# Patient Record
Sex: MALE | Race: OTHER | NOT HISPANIC OR LATINO
[De-identification: names, ages, dates, MRNs, and addresses within clinical notes are randomized per-mention and may not be internally consistent; named-entity substitution may affect disease eponyms.]

---

## 2019-12-05 ENCOUNTER — APPOINTMENT (OUTPATIENT)
Dept: DERMATOLOGY | Facility: CLINIC | Age: 24
End: 2019-12-05
Payer: MEDICAID

## 2019-12-05 VITALS — BODY MASS INDEX: 27.98 KG/M2 | HEIGHT: 75 IN | WEIGHT: 225 LBS

## 2019-12-05 DIAGNOSIS — Z77.22 CONTACT WITH AND (SUSPECTED) EXPOSURE TO ENVIRONMENTAL TOBACCO SMOKE (ACUTE) (CHRONIC): ICD-10-CM

## 2019-12-05 PROBLEM — Z00.00 ENCOUNTER FOR PREVENTIVE HEALTH EXAMINATION: Status: ACTIVE | Noted: 2019-12-05

## 2019-12-05 PROCEDURE — 17111 DESTRUCTION B9 LESIONS 15/>: CPT

## 2019-12-05 RX ORDER — BUPRENORPHINE HCL/NALOXONE HCL 8 MG-2 MG
TABLET, SUBLINGUAL SUBLINGUAL
Refills: 0 | Status: ACTIVE | COMMUNITY

## 2019-12-05 NOTE — HISTORY OF PRESENT ILLNESS
[FreeTextEntry1] : Warts [de-identified] : First visit for 24-year-old white male with a two-year history of multiple warts on the hands. Treated with cryosurgery in the past-last time 2 months ago.\par States that warts have recently increased markedly.

## 2019-12-05 NOTE — CONSULT LETTER
[Consult Closing:] : Thank you very much for allowing me to participate in the care of this patient.  If you have any questions, please do not hesitate to contact me. [Dear  ___] : Dear  [unfilled], [Consult Letter:] : I had the pleasure of evaluating your patient, [unfilled]. [Sincerely,] : Sincerely, [FreeTextEntry2] : Almita Gray, DO [FreeTextEntry1] : He has multiple warts on the fingers and wrists which were treated with cryosurgery.\par \par Given the large number of warts that he has, it might be reasonable to order an HIV test on him and to vaccinate him against venereal warts.\par \par Please see attached chart note for further details. [FreeTextEntry3] : Camron Clark MD\par [address], [address]\par [place] [address]\par Tel ([phone])\par Fax ([phone])\par Private line ([phone])\par

## 2019-12-05 NOTE — PHYSICAL EXAM
[Eyelids] : Eyelids [Face] : Face [Nose] : Nose [Ears] : Ears [Lips] : Lips [FreeTextEntry3] : \par \par Multiple 2-5 mm pink verrucous papules present on the right hand, especially the third and fourth fingers\par One on the dorsum of the left fourth finger\par A few 1 mm warts present on the left volar wrist

## 2020-01-16 ENCOUNTER — APPOINTMENT (OUTPATIENT)
Dept: DERMATOLOGY | Facility: CLINIC | Age: 25
End: 2020-01-16
Payer: MEDICAID

## 2020-01-16 PROCEDURE — 17111 DESTRUCTION B9 LESIONS 15/>: CPT

## 2020-01-16 NOTE — PHYSICAL EXAM
[Alert] : alert [Oriented x 3] : ~L oriented x 3 [Well Nourished] : well nourished [FreeTextEntry3] : Group of small and large brown verrucous papules present on the right third finger with a few lesions on the right fourth finger left index finger left palm and right third subungual area

## 2020-01-16 NOTE — HISTORY OF PRESENT ILLNESS
[FreeTextEntry1] : Warts [de-identified] : Followup visit for 24-year-old white male first seen by me on December 5, 2019, with multiple warts on hands and fingers. 25+ lesions treated  with cryosurgery.  Warts have improved.

## 2020-01-21 ENCOUNTER — APPOINTMENT (OUTPATIENT)
Dept: PULMONOLOGY | Facility: CLINIC | Age: 25
End: 2020-01-21
Payer: MEDICAID

## 2020-01-21 VITALS
DIASTOLIC BLOOD PRESSURE: 70 MMHG | SYSTOLIC BLOOD PRESSURE: 120 MMHG | HEIGHT: 74 IN | OXYGEN SATURATION: 97 % | HEART RATE: 102 BPM | WEIGHT: 232 LBS | BODY MASS INDEX: 29.77 KG/M2

## 2020-01-21 DIAGNOSIS — Z86.39 PERSONAL HISTORY OF OTHER ENDOCRINE, NUTRITIONAL AND METABOLIC DISEASE: ICD-10-CM

## 2020-01-21 DIAGNOSIS — L30.9 DERMATITIS, UNSPECIFIED: ICD-10-CM

## 2020-01-21 DIAGNOSIS — F19.11 OTHER PSYCHOACTIVE SUBSTANCE ABUSE, IN REMISSION: ICD-10-CM

## 2020-01-21 DIAGNOSIS — Z80.8 FAMILY HISTORY OF MALIGNANT NEOPLASM OF OTHER ORGANS OR SYSTEMS: ICD-10-CM

## 2020-01-21 DIAGNOSIS — Z82.69 FAMILY HISTORY OF OTHER DISEASES OF THE MUSCULOSKELETAL SYSTEM AND CONNECTIVE TISSUE: ICD-10-CM

## 2020-01-21 PROCEDURE — 99204 OFFICE O/P NEW MOD 45 MIN: CPT

## 2020-02-13 ENCOUNTER — APPOINTMENT (OUTPATIENT)
Dept: DERMATOLOGY | Facility: CLINIC | Age: 25
End: 2020-02-13

## 2020-03-12 ENCOUNTER — APPOINTMENT (OUTPATIENT)
Dept: DERMATOLOGY | Facility: CLINIC | Age: 25
End: 2020-03-12
Payer: COMMERCIAL

## 2020-03-12 DIAGNOSIS — B07.8 OTHER VIRAL WARTS: ICD-10-CM

## 2020-03-12 DIAGNOSIS — R20.8 OTHER DISTURBANCES OF SKIN SENSATION: ICD-10-CM

## 2020-03-12 DIAGNOSIS — L24.9 IRRITANT CONTACT DERMATITIS, UNSPECIFIED CAUSE: ICD-10-CM

## 2020-03-12 PROCEDURE — 99213 OFFICE O/P EST LOW 20 MIN: CPT | Mod: 25

## 2020-03-12 PROCEDURE — 17110 DESTRUCTION B9 LES UP TO 14: CPT

## 2020-03-12 NOTE — PHYSICAL EXAM
[Alert] : alert [Oriented x 3] : ~L oriented x 3 [Well Nourished] : well nourished [FreeTextEntry3] : Hands:  Focal pink scaly and vesicular patches present in the right 3/4 finger web area and left lateral index finger area\par \par A few 2-3 mm tan papule was present on the right fourth finger \par One similar lesion in the right third finger\par 1 mm papules present on the left fifth finger and left volar wrist (2)\par Larger wart present in the right third subungual area

## 2020-03-12 NOTE — HISTORY OF PRESENT ILLNESS
[FreeTextEntry1] : Warts [de-identified] : Followup visit for 24-year-old white male last seen by me on January 16, 2020, with multiple warts on the fingers.  Has been treated with cryosurgery x2.\par Patient also complains of "eczema" from frequent hand washing.

## 2020-04-16 ENCOUNTER — APPOINTMENT (OUTPATIENT)
Dept: DERMATOLOGY | Facility: CLINIC | Age: 25
End: 2020-04-16

## 2020-07-07 ENCOUNTER — EMERGENCY (EMERGENCY)
Facility: HOSPITAL | Age: 25
LOS: 1 days | Discharge: HOME | End: 2020-07-07
Attending: STUDENT IN AN ORGANIZED HEALTH CARE EDUCATION/TRAINING PROGRAM
Payer: COMMERCIAL

## 2020-07-07 VITALS
TEMPERATURE: 98 F | HEIGHT: 75 IN | SYSTOLIC BLOOD PRESSURE: 112 MMHG | OXYGEN SATURATION: 98 % | DIASTOLIC BLOOD PRESSURE: 82 MMHG | RESPIRATION RATE: 16 BRPM | HEART RATE: 123 BPM | WEIGHT: 235.01 LBS

## 2020-07-07 PROCEDURE — 99283 EMERGENCY DEPT VISIT LOW MDM: CPT

## 2020-07-07 RX ORDER — SODIUM CHLORIDE 9 MG/ML
1000 INJECTION INTRAMUSCULAR; INTRAVENOUS; SUBCUTANEOUS ONCE
Refills: 0 | Status: COMPLETED | OUTPATIENT
Start: 2020-07-07 | End: 2020-07-07

## 2020-07-07 NOTE — ED PROVIDER NOTE - PROGRESS NOTE DETAILS
Results of work up discussed with patient and copies of all reports given.  Patient was already told by therapist that he should see a psychiatrist for anxiety and possibly to start low-dose benzo.  Suspect IBS.  Patient states his mom has both IBS and anxiety.  Will give GI referral.  Patient looks very comfortable. Results of work up discussed with patient and copies of all reports given.  Patient was already told by therapist that he should see a psychiatrist for anxiety and possibly to start low-dose benzo.  Suspect IBS.  Patient states his mom has both IBS and anxiety.  Will give GI referral.  Patient looks very comfortable.  Offered CT abd/pelvis but patient declining.  States he will schedule as an outpatient and understands to return to the ED for any concerns.

## 2020-07-07 NOTE — ED PROVIDER NOTE - CARE PROVIDER_API CALL
Robin Munguia  GASTROENTEROLOGY  [address] [place] [address]  Phone: [phone]  Fax: [phone]  Follow Up Time:

## 2020-07-07 NOTE — ED PROVIDER NOTE - NSFOLLOWUPINSTRUCTIONS_ED_ALL_ED_FT
Please follow up with your primary care doctor and GI.  Return to the ER for persistent abdominal pain, fever, vomiting, diarrhea, anxiety, depression, suicidal thoughts, or any other concerns.

## 2020-07-07 NOTE — ED PROVIDER NOTE - PATIENT PORTAL LINK FT
You can access the FollowMyHealth Patient Portal offered by [facility] by registering at the following website: [email or web address]. By joining [employer]’s FollowMyHealth portal, you will also be able to view your health information using other applications (apps) compatible with our system.

## 2020-07-07 NOTE — ED ADULT NURSE NOTE - OBJECTIVE STATEMENT
Pt presents to ED c/o nausea and anxiety x1 month. Pt states his gf gave him news that is taking a toll on him. He has a family hx of depression and anxiety. Pt states his cousin recently committed suicide. Pt feels depressed and stressed out. Pt woke up today diaphoretic and couldn't eat be cause he is nauseous. Pt denies nausea, chest pain, SOB at this time. Pt presents to ED c/o nausea and anxiety x1 month. Pt states his gf gave him news that is taking a toll on him. He has a family hx of depression and anxiety. Pt states his cousin recently committed suicide. Pt feels depressed and stressed out sees a therapist every Thursday. Pt woke up today diaphoretic vomited and had diarrhea and couldn't eat be cause he is nauseous but is tolerating PO fluids. Pt denies nausea, chest pain, SOB at this time.

## 2020-07-07 NOTE — ED PROVIDER NOTE - CHPI ED SYMPTOMS NEG
no burning urination/no hematuria/no fever/no blood in stool/no abdominal distension/no chills/no dysuria

## 2020-07-07 NOTE — ED PROVIDER NOTE - OBJECTIVE STATEMENT
24 year old male presents with vomiting, diarrhea, and weakness intermittently x  1 month.  Patient states this morning, he woke up diaphoretic with V x 2 and D x 1, non-bloody.  He denies abdominal pain, fever.  He states these symptoms have been occurring 4-5 times over the last month but today is the first time he sought a medical evaluation.  He is tolerating liquids but has not eaten today.  He has been taking Omeprazole for the past week due to worsening GERD.  In addition, he reports feeling anxious and depressed due to the pandemic.  Patient lost his job and his cousin, who was an EMT, committed suicide.  Patient denies SI/HI, hearing voices.  Patient was part of the  and sees a therapist once a week.  PMD Maye Godwin. 24 year old male presents with vomiting, diarrhea, and weakness intermittently x  1 month.  Patient states this morning, he woke up diaphoretic with V x 2 and D x 1, non-bloody.  He denies abdominal pain, fever.  He states these symptoms have been occurring 4-5 times over the last month but today is the first time he sought a medical evaluation.  He is tolerating liquids but has not eaten today.  He has been taking Omeprazole for the past week due to worsening GERD.  In addition, he reports feeling anxious and depressed due to the pandemic.  Patient lost his job and his cousin, who was an EMT, committed suicide.  Patient denies SI/HI, hearing voices.  Patient was part of the  and sees a therapist once a week.  On Suboxone for 2 years due to past heroin addiction. PMD Maye Godwin.

## 2020-07-07 NOTE — ED ADULT NURSE NOTE - NSIMPLEMENTINTERV_GEN_ALL_ED
Implemented All Universal Safety Interventions:  [place] to call system. Call bell, personal items and telephone within reach. Instruct patient to call for assistance. Room bathroom lighting operational. Non-slip footwear when patient is off stretcher. Physically safe environment: no spills, clutter or unnecessary equipment. Stretcher in lowest position, wheels locked, appropriate side rails in place.

## 2020-07-07 NOTE — ED PROVIDER NOTE - GASTROINTESTINAL, MLM
Abdomen soft, non-tender, non-distended.  No rebound, no guarding, +BS.  Non-tender to deep palpation in all  4 quadrants

## 2020-07-07 NOTE — ED ADULT NURSE NOTE - ILLNESS RECENT EXPOSURE
ESRD on HD TTS x5 yrs via [facility] at [facility] under direction of Dr. Levy.   Last HD today 1.5 hrs into 3.5 hr HD tx  Lytes and acid base okay  And no signs of volume excess.   Lab Results   Component Value Date     (H) 08/16/2011    CALCIUM 8.4 (L) 12/16/2019    PHOS 3.0 07/17/2019   Nephrology following-TTS -Plan for HD tomorrow 12/17/19         None known

## 2020-07-07 NOTE — ED PROVIDER NOTE - CLINICAL SUMMARY MEDICAL DECISION MAKING FREE TEXT BOX
24 year old male with intermittent V/D x 1 month associated with worsening anxiety and depression during COVID pandemic.  Not suicidal.  Patient looks lizabeth comfortable in NAD.  Labs, hydrate, EKG, reassess

## 2020-07-08 VITALS
TEMPERATURE: 98 F | SYSTOLIC BLOOD PRESSURE: 125 MMHG | HEART RATE: 87 BPM | RESPIRATION RATE: 13 BRPM | OXYGEN SATURATION: 96 % | DIASTOLIC BLOOD PRESSURE: 78 MMHG

## 2020-07-08 LAB
ALBUMIN SERPL ELPH-MCNC: 4.5 G/DL — SIGNIFICANT CHANGE UP (ref 3.3–5)
ALP SERPL-CCNC: 81 U/L — SIGNIFICANT CHANGE UP (ref 40–120)
ALT FLD-CCNC: 73 U/L — SIGNIFICANT CHANGE UP (ref 12–78)
AMYLASE P1 CFR SERPL: 33 U/L — SIGNIFICANT CHANGE UP (ref 25–125)
ANION GAP SERPL CALC-SCNC: 9 MMOL/L — SIGNIFICANT CHANGE UP (ref 5–17)
AST SERPL-CCNC: 29 U/L — SIGNIFICANT CHANGE UP (ref 15–37)
BASOPHILS # BLD AUTO: 0.02 K/UL — SIGNIFICANT CHANGE UP (ref 0–0.2)
BASOPHILS NFR BLD AUTO: 0.2 % — SIGNIFICANT CHANGE UP (ref 0–2)
BILIRUB SERPL-MCNC: 0.5 MG/DL — SIGNIFICANT CHANGE UP (ref 0.2–1.2)
BUN SERPL-MCNC: 7 MG/DL — SIGNIFICANT CHANGE UP (ref 7–23)
CALCIUM SERPL-MCNC: 9.3 MG/DL — SIGNIFICANT CHANGE UP (ref 8.5–10.1)
CHLORIDE SERPL-SCNC: 106 MMOL/L — SIGNIFICANT CHANGE UP (ref 96–108)
CO2 SERPL-SCNC: 26 MMOL/L — SIGNIFICANT CHANGE UP (ref 22–31)
CREAT SERPL-MCNC: 1 MG/DL — SIGNIFICANT CHANGE UP (ref 0.5–1.3)
EOSINOPHIL # BLD AUTO: 0 K/UL — SIGNIFICANT CHANGE UP (ref 0–0.5)
EOSINOPHIL NFR BLD AUTO: 0 % — SIGNIFICANT CHANGE UP (ref 0–6)
GLUCOSE SERPL-MCNC: 112 MG/DL — HIGH (ref 70–99)
HCT VFR BLD CALC: 46.7 % — SIGNIFICANT CHANGE UP (ref 39–50)
HGB BLD-MCNC: 15.7 G/DL — SIGNIFICANT CHANGE UP (ref 13–17)
IMM GRANULOCYTES NFR BLD AUTO: 0.1 % — SIGNIFICANT CHANGE UP (ref 0–1.5)
LIDOCAIN IGE QN: 65 U/L — LOW (ref 73–393)
LYMPHOCYTES # BLD AUTO: 0.6 K/UL — LOW (ref 1–3.3)
LYMPHOCYTES # BLD AUTO: 6.3 % — LOW (ref 13–44)
MCHC RBC-ENTMCNC: 26.9 PG — LOW (ref 27–34)
MCHC RBC-ENTMCNC: 33.6 GM/DL — SIGNIFICANT CHANGE UP (ref 32–36)
MCV RBC AUTO: 80.1 FL — SIGNIFICANT CHANGE UP (ref 80–100)
MONOCYTES # BLD AUTO: 0.38 K/UL — SIGNIFICANT CHANGE UP (ref 0–0.9)
MONOCYTES NFR BLD AUTO: 4 % — SIGNIFICANT CHANGE UP (ref 2–14)
NEUTROPHILS # BLD AUTO: 8.5 K/UL — HIGH (ref 1.8–7.4)
NEUTROPHILS NFR BLD AUTO: 89.4 % — HIGH (ref 43–77)
NRBC # BLD: 0 /100 WBCS — SIGNIFICANT CHANGE UP (ref 0–0)
PLATELET # BLD AUTO: 283 K/UL — SIGNIFICANT CHANGE UP (ref 150–400)
POTASSIUM SERPL-MCNC: 3.6 MMOL/L — SIGNIFICANT CHANGE UP (ref 3.5–5.3)
POTASSIUM SERPL-SCNC: 3.6 MMOL/L — SIGNIFICANT CHANGE UP (ref 3.5–5.3)
PROT SERPL-MCNC: 8.5 G/DL — HIGH (ref 6–8.3)
RBC # BLD: 5.83 M/UL — HIGH (ref 4.2–5.8)
RBC # FLD: 13 % — SIGNIFICANT CHANGE UP (ref 10.3–14.5)
SODIUM SERPL-SCNC: 141 MMOL/L — SIGNIFICANT CHANGE UP (ref 135–145)
WBC # BLD: 9.51 K/UL — SIGNIFICANT CHANGE UP (ref 3.8–10.5)
WBC # FLD AUTO: 9.51 K/UL — SIGNIFICANT CHANGE UP (ref 3.8–10.5)

## 2020-07-08 PROCEDURE — 96360 HYDRATION IV INFUSION INIT: CPT

## 2020-07-08 PROCEDURE — 80053 COMPREHEN METABOLIC PANEL: CPT

## 2020-07-08 PROCEDURE — 36415 COLL VENOUS BLD VENIPUNCTURE: CPT

## 2020-07-08 PROCEDURE — 83690 ASSAY OF LIPASE: CPT

## 2020-07-08 PROCEDURE — 82150 ASSAY OF AMYLASE: CPT

## 2020-07-08 PROCEDURE — 93005 ELECTROCARDIOGRAM TRACING: CPT

## 2020-07-08 PROCEDURE — 99283 EMERGENCY DEPT VISIT LOW MDM: CPT | Mod: 25

## 2020-07-08 PROCEDURE — 85027 COMPLETE CBC AUTOMATED: CPT

## 2020-07-08 PROCEDURE — 93010 ELECTROCARDIOGRAM REPORT: CPT

## 2020-07-22 ENCOUNTER — APPOINTMENT (OUTPATIENT)
Dept: GASTROENTEROLOGY | Facility: CLINIC | Age: 25
End: 2020-07-22
Payer: COMMERCIAL

## 2020-07-22 VITALS
HEIGHT: 74 IN | BODY MASS INDEX: 29 KG/M2 | WEIGHT: 226 LBS | HEART RATE: 83 BPM | DIASTOLIC BLOOD PRESSURE: 80 MMHG | SYSTOLIC BLOOD PRESSURE: 130 MMHG | OXYGEN SATURATION: 97 % | TEMPERATURE: 98.2 F

## 2020-07-22 PROCEDURE — 99203 OFFICE O/P NEW LOW 30 MIN: CPT

## 2020-07-22 NOTE — PHYSICAL EXAM
[General Appearance - Alert] : alert [General Appearance - In No Acute Distress] : in no acute distress [Sclera] : the sclera and conjunctiva were normal [Extraocular Movements] : extraocular movements were intact [Outer Ear] : the ears and nose were normal in appearance [Neck Appearance] : the appearance of the neck was normal [] : no respiratory distress [Bowel Sounds] : normal bowel sounds [Abdomen Soft] : soft [No Spinal Tenderness] : no spinal tenderness [No CVA Tenderness] : no ~M costovertebral angle tenderness [Oriented To Time, Place, And Person] : oriented to person, place, and time [Affect] : the affect was normal [Impaired Insight] : insight and judgment were intact

## 2020-07-22 NOTE — ASSESSMENT
[FreeTextEntry1] : This is a 24-year-old male, who presents with heartburn and epigastric pain and weight loss. He is on pantoprazole daily. We discussed a low acid diet and antireflux lifestyle. Has him up for an EGD. I discussed Pepcid AC p.r.n. for breakthrough symptoms.

## 2020-07-22 NOTE — HISTORY OF PRESENT ILLNESS
[de-identified] : This is a 24 year old man with GERD and epigastric pain for several weeks. He has frequent heartburn. He has associated epigastric pain and weight loss. He has a decreases appetite. He lost 10 lbs.  He denies nausea, vomiting and dysphagia. He denies NSAID use. He  started Protonix which has helped but he continues to have heartburn. He has been decreasing his Suboxone along with his substance abuse physician.

## 2020-07-29 DIAGNOSIS — Z01.818 ENCOUNTER FOR OTHER PREPROCEDURAL EXAMINATION: ICD-10-CM

## 2020-07-31 ENCOUNTER — APPOINTMENT (OUTPATIENT)
Dept: DISASTER EMERGENCY | Facility: CLINIC | Age: 25
End: 2020-07-31

## 2020-07-31 LAB — SARS-COV-2 N GENE NPH QL NAA+PROBE: NOT DETECTED

## 2020-08-03 ENCOUNTER — APPOINTMENT (OUTPATIENT)
Dept: GASTROENTEROLOGY | Facility: GI CENTER | Age: 25
End: 2020-08-03
Payer: COMMERCIAL

## 2020-08-03 ENCOUNTER — RESULT REVIEW (OUTPATIENT)
Age: 25
End: 2020-08-03

## 2020-08-03 ENCOUNTER — OUTPATIENT (OUTPATIENT)
Dept: OUTPATIENT SERVICES | Facility: HOSPITAL | Age: 25
LOS: 1 days | End: 2020-08-03
Payer: COMMERCIAL

## 2020-08-03 DIAGNOSIS — R63.4 ABNORMAL WEIGHT LOSS: ICD-10-CM

## 2020-08-03 DIAGNOSIS — R12 HEARTBURN: ICD-10-CM

## 2020-08-03 DIAGNOSIS — K31.9 DISEASE OF STOMACH AND DUODENUM, UNSPECIFIED: ICD-10-CM

## 2020-08-03 DIAGNOSIS — K21.9 GASTRO-ESOPHAGEAL REFLUX DISEASE W/OUT ESOPHAGITIS: ICD-10-CM

## 2020-08-03 DIAGNOSIS — Z80.0 FAMILY HISTORY OF MALIGNANT NEOPLASM OF DIGESTIVE ORGANS: ICD-10-CM

## 2020-08-03 PROCEDURE — 43239 EGD BIOPSY SINGLE/MULTIPLE: CPT

## 2020-08-03 PROCEDURE — 88342 IMHCHEM/IMCYTCHM 1ST ANTB: CPT | Mod: 26

## 2020-08-03 PROCEDURE — 88342 IMHCHEM/IMCYTCHM 1ST ANTB: CPT

## 2020-08-03 PROCEDURE — 88305 TISSUE EXAM BY PATHOLOGIST: CPT | Mod: 26

## 2020-08-03 PROCEDURE — 88305 TISSUE EXAM BY PATHOLOGIST: CPT

## 2020-08-03 NOTE — HISTORY OF PRESENT ILLNESS
[FreeTextEntry1] : 25 yo WM with HB and Epigastric pain and minor weight loss 10 #. Partially responsive to use of Pantoprazole.  Still some breakthrough sx.  No alarm sx.  On Suboxone.  \par Recent Nasal swab is negative.

## 2020-08-03 NOTE — PROCEDURE
[Heartburn] : heartburn [Propofol ___ mg IV] : Propofol [unfilled] ~Umg intravenously [2] : 2 [de-identified] : [id number] [Normal] : Normal [de-identified] : Regurgitation [de-identified] : Z line was intact at 42 cm.  No HH, EE, BE, Varices.  [de-identified] : No laxity at GEJ.   [de-identified] : 4 random biopsies were done.  [de-identified] : Normal EGD.   [de-identified] : 2 random biopsies were done. Pylorus was normal.

## 2020-08-03 NOTE — REASON FOR VISIT
[Follow-Up: _____] : a [unfilled] follow-up visit [FreeTextEntry2] : Heartburn / Regurgitation.  [Endoscopy] : an endoscopy

## 2020-08-03 NOTE — ASSESSMENT
[FreeTextEntry1] : Normal EGD.  NERD is the likely diagnosis.  Call in 1 week for path check.  Same Pantoprazole x 2 months.  Anti reflux diet.  GI OV in 2 months.

## 2020-08-03 NOTE — PHYSICAL EXAM
[General Appearance - In No Acute Distress] : in no acute distress [General Appearance - Alert] : alert [Sclera] : the sclera and conjunctiva were normal [PERRL With Normal Accommodation] : pupils were equal in size, round, and reactive to light [Extraocular Movements] : extraocular movements were intact [Oropharynx] : the oropharynx was normal [Outer Ear] : the ears and nose were normal in appearance [Neck Appearance] : the appearance of the neck was normal [Neck Cervical Mass (___cm)] : no neck mass was observed [Thyroid Diffuse Enlargement] : the thyroid was not enlarged [Jugular Venous Distention Increased] : there was no jugular-venous distention [Thyroid Nodule] : there were no palpable thyroid nodules [Auscultation Breath Sounds / Voice Sounds] : lungs were clear to auscultation bilaterally [Heart Rate And Rhythm] : heart rate was normal and rhythm regular [Heart Sounds] : normal S1 and S2 [Murmurs] : no murmurs [Heart Sounds Gallop] : no gallops [Heart Sounds Pericardial Friction Rub] : no pericardial rub [Bowel Sounds] : normal bowel sounds [Abdomen Soft] : soft [Abdomen Tenderness] : non-tender [] : no hepato-splenomegaly [Abdomen Mass (___ Cm)] : no abdominal mass palpated [Abnormal Walk] : normal gait [Nail Clubbing] : no clubbing  or cyanosis of the fingernails [Musculoskeletal - Swelling] : no joint swelling seen [Motor Tone] : muscle strength and tone were normal

## 2020-08-03 NOTE — PROCEDURE
[Heartburn] : heartburn [Propofol ___ mg IV] : Propofol [unfilled] ~Umg intravenously [2] : 2 [Normal] : Normal [de-identified] : Regurgitation [de-identified] : [id number] [de-identified] : 4 random biopsies were done.  [de-identified] : No laxity at GEJ.   [de-identified] : Z line was intact at 42 cm.  No HH, EE, BE, Varices.  [de-identified] : 2 random biopsies were done. Pylorus was normal.  [de-identified] : Normal EGD.

## 2020-08-03 NOTE — PHYSICAL EXAM
[General Appearance - Alert] : alert [General Appearance - In No Acute Distress] : in no acute distress [Sclera] : the sclera and conjunctiva were normal [PERRL With Normal Accommodation] : pupils were equal in size, round, and reactive to light [Extraocular Movements] : extraocular movements were intact [Outer Ear] : the ears and nose were normal in appearance [Oropharynx] : the oropharynx was normal [Neck Appearance] : the appearance of the neck was normal [Neck Cervical Mass (___cm)] : no neck mass was observed [Thyroid Diffuse Enlargement] : the thyroid was not enlarged [Jugular Venous Distention Increased] : there was no jugular-venous distention [Thyroid Nodule] : there were no palpable thyroid nodules [Heart Rate And Rhythm] : heart rate was normal and rhythm regular [Auscultation Breath Sounds / Voice Sounds] : lungs were clear to auscultation bilaterally [Heart Sounds] : normal S1 and S2 [Murmurs] : no murmurs [Heart Sounds Gallop] : no gallops [Heart Sounds Pericardial Friction Rub] : no pericardial rub [Bowel Sounds] : normal bowel sounds [Abdomen Tenderness] : non-tender [Abdomen Soft] : soft [Abdomen Mass (___ Cm)] : no abdominal mass palpated [] : no hepato-splenomegaly [Abnormal Walk] : normal gait [Nail Clubbing] : no clubbing  or cyanosis of the fingernails [Musculoskeletal - Swelling] : no joint swelling seen [Motor Tone] : muscle strength and tone were normal

## 2020-08-05 LAB — SURGICAL PATHOLOGY STUDY: SIGNIFICANT CHANGE UP

## 2020-09-29 ENCOUNTER — OUTPATIENT (OUTPATIENT)
Dept: OUTPATIENT SERVICES | Facility: HOSPITAL | Age: 25
LOS: 1 days | End: 2020-09-29
Payer: COMMERCIAL

## 2020-09-29 DIAGNOSIS — G47.33 OBSTRUCTIVE SLEEP APNEA (ADULT) (PEDIATRIC): ICD-10-CM

## 2020-09-29 PROCEDURE — 95810 POLYSOM 6/> YRS 4/> PARAM: CPT

## 2020-09-29 PROCEDURE — 95810 POLYSOM 6/> YRS 4/> PARAM: CPT | Mod: 26

## 2020-10-07 ENCOUNTER — APPOINTMENT (OUTPATIENT)
Dept: PULMONOLOGY | Facility: CLINIC | Age: 25
End: 2020-10-07

## 2020-11-05 ENCOUNTER — EMERGENCY (EMERGENCY)
Facility: HOSPITAL | Age: 25
LOS: 1 days | End: 2020-11-05
Attending: EMERGENCY MEDICINE
Payer: COMMERCIAL

## 2020-11-05 VITALS
HEART RATE: 81 BPM | HEIGHT: 75 IN | RESPIRATION RATE: 20 BRPM | DIASTOLIC BLOOD PRESSURE: 91 MMHG | TEMPERATURE: 99 F | WEIGHT: 225.09 LBS | OXYGEN SATURATION: 96 % | SYSTOLIC BLOOD PRESSURE: 148 MMHG

## 2020-11-05 PROCEDURE — 99283 EMERGENCY DEPT VISIT LOW MDM: CPT | Mod: 25

## 2020-11-05 PROCEDURE — 99283 EMERGENCY DEPT VISIT LOW MDM: CPT

## 2020-11-05 PROCEDURE — 96360 HYDRATION IV INFUSION INIT: CPT

## 2020-11-05 RX ORDER — SODIUM CHLORIDE 9 MG/ML
1000 INJECTION INTRAMUSCULAR; INTRAVENOUS; SUBCUTANEOUS ONCE
Refills: 0 | Status: COMPLETED | OUTPATIENT
Start: 2020-11-05 | End: 2020-11-05

## 2020-11-05 RX ADMIN — SODIUM CHLORIDE 1000 MILLILITER(S): 9 INJECTION INTRAMUSCULAR; INTRAVENOUS; SUBCUTANEOUS at 21:38

## 2020-11-05 RX ADMIN — SODIUM CHLORIDE 1000 MILLILITER(S): 9 INJECTION INTRAMUSCULAR; INTRAVENOUS; SUBCUTANEOUS at 22:35

## 2020-11-05 NOTE — ED ADULT TRIAGE NOTE - CHIEF COMPLAINT QUOTE
Pt has been on Suboxone for 3 years - snorted  Heroin about 24hours -pt vomiting and diarrhea since this am - chills sweats -diff sleeping

## 2020-11-05 NOTE — ED ADULT NURSE NOTE - OBJECTIVE STATEMENT
received pt in bed #4 Pt A&O States he has been on Suboxone x 3 years & relapsed on Tuesday & snorted 1/2 gram heroin. Today he is c/o n/v/d & h/a

## 2020-11-05 NOTE — ED PROVIDER NOTE - PATIENT PORTAL LINK FT
You can access the FollowMyHealth Patient Portal offered by [facility] by registering at the following website: [email or web address]. By joining [employer]’s FollowMyHealth portal, you will also be able to view your health information using other applications (apps) compatible with our system.

## 2020-11-05 NOTE — ED PROVIDER NOTE - OBJECTIVE STATEMENT
24yo male who presents with nausea, vomiting and diarrhea today. pt states he was on suboxone for 3 years and relapsed and used heroin 2 days ago, and then started suboxone today and started to vomit with diarrhea, no fever, +chills, no other complaints

## 2020-11-06 ENCOUNTER — EMERGENCY (EMERGENCY)
Facility: HOSPITAL | Age: 25
LOS: 1 days | Discharge: HOME | End: 2020-11-06
Attending: EMERGENCY MEDICINE | Admitting: EMERGENCY MEDICINE
Payer: COMMERCIAL

## 2020-11-06 VITALS
OXYGEN SATURATION: 98 % | WEIGHT: 225.09 LBS | HEART RATE: 75 BPM | DIASTOLIC BLOOD PRESSURE: 88 MMHG | TEMPERATURE: 99 F | HEIGHT: 75 IN | SYSTOLIC BLOOD PRESSURE: 142 MMHG | RESPIRATION RATE: 18 BRPM

## 2020-11-06 VITALS
DIASTOLIC BLOOD PRESSURE: 70 MMHG | OXYGEN SATURATION: 98 % | TEMPERATURE: 98 F | HEART RATE: 58 BPM | RESPIRATION RATE: 16 BRPM | SYSTOLIC BLOOD PRESSURE: 116 MMHG

## 2020-11-06 LAB
ALBUMIN SERPL ELPH-MCNC: 4.2 G/DL — SIGNIFICANT CHANGE UP (ref 3.3–5)
ALP SERPL-CCNC: 82 U/L — SIGNIFICANT CHANGE UP (ref 40–120)
ALT FLD-CCNC: 67 U/L — SIGNIFICANT CHANGE UP (ref 12–78)
ANION GAP SERPL CALC-SCNC: 7 MMOL/L — SIGNIFICANT CHANGE UP (ref 5–17)
AST SERPL-CCNC: 32 U/L — SIGNIFICANT CHANGE UP (ref 15–37)
BASOPHILS # BLD AUTO: 0.02 K/UL — SIGNIFICANT CHANGE UP (ref 0–0.2)
BASOPHILS NFR BLD AUTO: 0.2 % — SIGNIFICANT CHANGE UP (ref 0–2)
BILIRUB SERPL-MCNC: 0.7 MG/DL — SIGNIFICANT CHANGE UP (ref 0.2–1.2)
BUN SERPL-MCNC: 10 MG/DL — SIGNIFICANT CHANGE UP (ref 7–23)
CALCIUM SERPL-MCNC: 9.5 MG/DL — SIGNIFICANT CHANGE UP (ref 8.5–10.1)
CHLORIDE SERPL-SCNC: 108 MMOL/L — SIGNIFICANT CHANGE UP (ref 96–108)
CO2 SERPL-SCNC: 25 MMOL/L — SIGNIFICANT CHANGE UP (ref 22–31)
CREAT SERPL-MCNC: 0.95 MG/DL — SIGNIFICANT CHANGE UP (ref 0.5–1.3)
EOSINOPHIL # BLD AUTO: 0.02 K/UL — SIGNIFICANT CHANGE UP (ref 0–0.5)
EOSINOPHIL NFR BLD AUTO: 0.2 % — SIGNIFICANT CHANGE UP (ref 0–6)
GLUCOSE SERPL-MCNC: 111 MG/DL — HIGH (ref 70–99)
HCT VFR BLD CALC: 42.7 % — SIGNIFICANT CHANGE UP (ref 39–50)
HGB BLD-MCNC: 14.8 G/DL — SIGNIFICANT CHANGE UP (ref 13–17)
IMM GRANULOCYTES NFR BLD AUTO: 0.3 % — SIGNIFICANT CHANGE UP (ref 0–1.5)
LIDOCAIN IGE QN: 78 U/L — SIGNIFICANT CHANGE UP (ref 73–393)
LYMPHOCYTES # BLD AUTO: 1.36 K/UL — SIGNIFICANT CHANGE UP (ref 1–3.3)
LYMPHOCYTES # BLD AUTO: 14.8 % — SIGNIFICANT CHANGE UP (ref 13–44)
MCHC RBC-ENTMCNC: 27.5 PG — SIGNIFICANT CHANGE UP (ref 27–34)
MCHC RBC-ENTMCNC: 34.7 GM/DL — SIGNIFICANT CHANGE UP (ref 32–36)
MCV RBC AUTO: 79.4 FL — LOW (ref 80–100)
MONOCYTES # BLD AUTO: 0.67 K/UL — SIGNIFICANT CHANGE UP (ref 0–0.9)
MONOCYTES NFR BLD AUTO: 7.3 % — SIGNIFICANT CHANGE UP (ref 2–14)
NEUTROPHILS # BLD AUTO: 7.12 K/UL — SIGNIFICANT CHANGE UP (ref 1.8–7.4)
NEUTROPHILS NFR BLD AUTO: 77.2 % — HIGH (ref 43–77)
NRBC # BLD: 0 /100 WBCS — SIGNIFICANT CHANGE UP (ref 0–0)
PLATELET # BLD AUTO: 244 K/UL — SIGNIFICANT CHANGE UP (ref 150–400)
POTASSIUM SERPL-MCNC: 3.7 MMOL/L — SIGNIFICANT CHANGE UP (ref 3.5–5.3)
POTASSIUM SERPL-SCNC: 3.7 MMOL/L — SIGNIFICANT CHANGE UP (ref 3.5–5.3)
PROT SERPL-MCNC: 8.4 G/DL — HIGH (ref 6–8.3)
RBC # BLD: 5.38 M/UL — SIGNIFICANT CHANGE UP (ref 4.2–5.8)
RBC # FLD: 13.8 % — SIGNIFICANT CHANGE UP (ref 10.3–14.5)
SARS-COV-2 RNA SPEC QL NAA+PROBE: SIGNIFICANT CHANGE UP
SODIUM SERPL-SCNC: 140 MMOL/L — SIGNIFICANT CHANGE UP (ref 135–145)
WBC # BLD: 9.22 K/UL — SIGNIFICANT CHANGE UP (ref 3.8–10.5)
WBC # FLD AUTO: 9.22 K/UL — SIGNIFICANT CHANGE UP (ref 3.8–10.5)

## 2020-11-06 PROCEDURE — 96374 THER/PROPH/DIAG INJ IV PUSH: CPT

## 2020-11-06 PROCEDURE — 83690 ASSAY OF LIPASE: CPT

## 2020-11-06 PROCEDURE — 99284 EMERGENCY DEPT VISIT MOD MDM: CPT | Mod: 25

## 2020-11-06 PROCEDURE — 36415 COLL VENOUS BLD VENIPUNCTURE: CPT

## 2020-11-06 PROCEDURE — 80053 COMPREHEN METABOLIC PANEL: CPT

## 2020-11-06 PROCEDURE — U0003: CPT

## 2020-11-06 PROCEDURE — 99283 EMERGENCY DEPT VISIT LOW MDM: CPT

## 2020-11-06 PROCEDURE — 85025 COMPLETE CBC W/AUTO DIFF WBC: CPT

## 2020-11-06 RX ORDER — SERTRALINE 25 MG/1
1 TABLET, FILM COATED ORAL
Qty: 0 | Refills: 0 | DISCHARGE

## 2020-11-06 RX ORDER — BUPRENORPHINE AND NALOXONE 2; .5 MG/1; MG/1
4 TABLET SUBLINGUAL
Qty: 0 | Refills: 0 | DISCHARGE

## 2020-11-06 RX ORDER — SODIUM CHLORIDE 9 MG/ML
1000 INJECTION INTRAMUSCULAR; INTRAVENOUS; SUBCUTANEOUS ONCE
Refills: 0 | Status: COMPLETED | OUTPATIENT
Start: 2020-11-06 | End: 2020-11-06

## 2020-11-06 RX ORDER — ONDANSETRON 8 MG/1
1 TABLET, FILM COATED ORAL
Qty: 15 | Refills: 0
Start: 2020-11-06 | End: 2020-11-10

## 2020-11-06 RX ORDER — ONDANSETRON 8 MG/1
4 TABLET, FILM COATED ORAL ONCE
Refills: 0 | Status: COMPLETED | OUTPATIENT
Start: 2020-11-06 | End: 2020-11-06

## 2020-11-06 RX ORDER — BUPRENORPHINE AND NALOXONE 2; .5 MG/1; MG/1
0 TABLET SUBLINGUAL
Qty: 0 | Refills: 0 | DISCHARGE

## 2020-11-06 RX ADMIN — SODIUM CHLORIDE 1000 MILLILITER(S): 9 INJECTION INTRAMUSCULAR; INTRAVENOUS; SUBCUTANEOUS at 10:33

## 2020-11-06 RX ADMIN — ONDANSETRON 4 MILLIGRAM(S): 8 TABLET, FILM COATED ORAL at 10:33

## 2020-11-06 NOTE — ED PROVIDER NOTE - PATIENT PORTAL LINK FT
You can access the FollowMyHealth Patient Portal offered by [facility] by registering at the following website: [email or web address]. By joining [employer]’s FollowMyHealth portal, you will also be able to view your health information using other applications (apps) compatible with our system.

## 2020-11-06 NOTE — ED ADULT NURSE NOTE - NSIMPLEMENTINTERV_GEN_ALL_ED
Implemented All Universal Safety Interventions:  [place] to call system. Call bell, personal items and telephone within reach. Instruct patient to call for assistance. Room bathroom lighting operational. Non-slip footwear when patient is off stretcher. Physically safe environment: no spills, clutter or unnecessary equipment. Stretcher in lowest position, wheels locked, appropriate side rails in place.

## 2020-11-06 NOTE — ED PROVIDER NOTE - OBJECTIVE STATEMENT
Pt is a 26 yo male who presents to the ED with a cc of possible withdrawal. Pt with past medical history of opoid abuse.  Reports that he had been clean and sober and on Suboxone for the last 3 years.  He states that on Tuesday he relapsed and used heroin. Pt reports that he does not inject. Pt reports that after using heroin he was concerned about when to restart his Suboxone. Pt reports that he restarted his Suboxone on 11/5 and then developed diaphoresis, chills, N/V, and loose watery chills. Pt reports that he felt shaky like he was undergoing withdrawal. He was seen in the ED last night and received IVF and Zofran. Pt improved and was discharged home but when he awoke today he was still experiencing the same symptoms and so he came to the ED again.  Pt has not spoken with the physician who prescribes his Suboxone.  Denies fever, CP, SOB, abd pain.  Pt has not noted any blood in his stool

## 2020-11-06 NOTE — ED ADULT TRIAGE NOTE - LOCATION:
Orders are now in place for labs. No immediate followup needed. I have ordered a repeat testosterone level as pt is being treated for male hypogonadism, and also a BMP which will check his kidney function and potassium level.  His K+ was low last summer, pr Right arm;

## 2020-11-06 NOTE — ED PROVIDER NOTE - CLINICAL SUMMARY MEDICAL DECISION MAKING FREE TEXT BOX
Pt is a 26 yo male who presents to the ED with a cc of possible withdrawal. Pt with past medical history of opoid abuse.  Reports that he had been clean and sober and on Suboxone for the last 3 years.  He states that on Tuesday he relapsed and used heroin. Pt reports that he does not inject. Pt reports that after using heroin he was concerned about when to restart his Suboxone. Pt reports that he restarted his Suboxone on 11/5 and then developed diaphoresis, chills, N/V, and loose watery chills. Pt reports that he felt shaky like he was undergoing withdrawal. He was seen in the ED last night and received IVF and Zofran. Pt improved and was discharged home but when he awoke today he was still experiencing the same symptoms and so he came to the ED again.  Pt has not spoken with the physician who prescribes his Suboxone.  Denies fever, CP, SOB, abd pain.  Pt has not noted any blood in his stool Pt is a 24 yo male who presents to the ED with a cc of possible withdrawal. Pt with past medical history of opoid abuse.  Reports that he had been clean and sober and on Suboxone for the last 3 years.  He states that on Tuesday he relapsed and used heroin. Pt reports that he does not inject. Pt reports that after using heroin he was concerned about when to restart his Suboxone. Pt reports that he restarted his Suboxone on 11/5 and then developed diaphoresis, chills, N/V, and loose watery chills. Pt reports that he felt shaky like he was undergoing withdrawal. He was seen in the ED last night and received IVF and Zofran. Pt improved and was discharged home but when he awoke today he was still experiencing the same symptoms and so he came to the ED again.  Pt has not spoken with the physician who prescribes his Suboxone.  Denies fever, CP, SOB, abd pain.  Pt has not noted any blood in his stool.  Pt with suspected withdrawal, will obtain screening labs, medicate for symptoms and check COVID swab.  Will monitor

## 2020-11-06 NOTE — ED PROVIDER NOTE - NSFOLLOWUPINSTRUCTIONS_ED_ALL_ED_FT
Return to the ED for any new or worsening symptoms  Take your medication as prescribed  Resume your Suboxone as previously prescribed  Zofran per label instructions as needed for nausea   Clear liquids advance as tolerated  Follow up with your addiction specialist  Advance activity as tolerated

## 2020-11-06 NOTE — ED PROVIDER NOTE - PROGRESS NOTE DETAILS
pt resting comfortably at this time, no active vomiting. Advised to continue his Suboxone. Prescription for Zofran sent. Results of labs and images reviewed, copy provided, all questions answered. Stable for discharge home with outpatient follow up

## 2020-12-02 PROBLEM — F11.10 OPIOID ABUSE, UNCOMPLICATED: Chronic | Status: ACTIVE | Noted: 2020-11-05

## 2021-01-05 ENCOUNTER — APPOINTMENT (OUTPATIENT)
Dept: PULMONOLOGY | Facility: CLINIC | Age: 26
End: 2021-01-05

## 2021-01-19 ENCOUNTER — APPOINTMENT (OUTPATIENT)
Dept: PULMONOLOGY | Facility: CLINIC | Age: 26
End: 2021-01-19
Payer: COMMERCIAL

## 2021-01-19 VITALS
OXYGEN SATURATION: 97 % | WEIGHT: 234 LBS | BODY MASS INDEX: 30.03 KG/M2 | SYSTOLIC BLOOD PRESSURE: 120 MMHG | HEIGHT: 74 IN | HEART RATE: 97 BPM | DIASTOLIC BLOOD PRESSURE: 80 MMHG

## 2021-01-19 DIAGNOSIS — Z86.59 PERSONAL HISTORY OF OTHER MENTAL AND BEHAVIORAL DISORDERS: ICD-10-CM

## 2021-01-19 PROCEDURE — 99072 ADDL SUPL MATRL&STAF TM PHE: CPT

## 2021-01-19 PROCEDURE — 99213 OFFICE O/P EST LOW 20 MIN: CPT

## 2021-01-19 RX ORDER — SERTRALINE HYDROCHLORIDE 25 MG/1
25 TABLET, FILM COATED ORAL
Refills: 0 | Status: DISCONTINUED | COMMUNITY
End: 2021-01-19

## 2021-01-19 RX ORDER — SERTRALINE HYDROCHLORIDE 100 MG/1
100 TABLET, FILM COATED ORAL
Qty: 30 | Refills: 0 | Status: ACTIVE | COMMUNITY
Start: 2020-12-14

## 2021-01-19 RX ORDER — OMEPRAZOLE 40 MG/1
40 CAPSULE, DELAYED RELEASE ORAL
Qty: 30 | Refills: 0 | Status: ACTIVE | COMMUNITY
Start: 2021-01-14

## 2021-01-19 RX ORDER — SERTRALINE HYDROCHLORIDE 50 MG/1
50 TABLET, FILM COATED ORAL
Qty: 30 | Refills: 0 | Status: DISCONTINUED | COMMUNITY
Start: 2021-01-17 | End: 2021-01-19

## 2021-01-19 RX ORDER — PANTOPRAZOLE SODIUM 40 MG/1
40 GRANULE, DELAYED RELEASE ORAL
Refills: 0 | Status: COMPLETED | COMMUNITY
End: 2021-01-19

## 2021-01-19 RX ORDER — PANTOPRAZOLE 40 MG/1
40 TABLET, DELAYED RELEASE ORAL
Qty: 90 | Refills: 1 | Status: DISCONTINUED | COMMUNITY
Start: 2020-08-03 | End: 2021-01-19

## 2021-01-27 ENCOUNTER — APPOINTMENT (OUTPATIENT)
Dept: DISASTER EMERGENCY | Facility: CLINIC | Age: 26
End: 2021-01-27

## 2021-01-28 LAB — SARS-COV-2 N GENE NPH QL NAA+PROBE: NOT DETECTED

## 2021-01-30 ENCOUNTER — OUTPATIENT (OUTPATIENT)
Dept: OUTPATIENT SERVICES | Facility: HOSPITAL | Age: 26
LOS: 1 days | End: 2021-01-30
Payer: COMMERCIAL

## 2021-01-30 DIAGNOSIS — G47.33 OBSTRUCTIVE SLEEP APNEA (ADULT) (PEDIATRIC): ICD-10-CM

## 2021-01-30 PROCEDURE — 95811 POLYSOM 6/>YRS CPAP 4/> PARM: CPT

## 2021-01-30 PROCEDURE — 95811 POLYSOM 6/>YRS CPAP 4/> PARM: CPT | Mod: 26

## 2021-02-17 ENCOUNTER — APPOINTMENT (OUTPATIENT)
Dept: PULMONOLOGY | Facility: CLINIC | Age: 26
End: 2021-02-17

## 2021-03-05 ENCOUNTER — APPOINTMENT (OUTPATIENT)
Dept: PULMONOLOGY | Facility: CLINIC | Age: 26
End: 2021-03-05
Payer: COMMERCIAL

## 2021-03-05 VITALS — OXYGEN SATURATION: 96 % | RESPIRATION RATE: 16 BRPM | HEART RATE: 101 BPM

## 2021-03-05 VITALS — SYSTOLIC BLOOD PRESSURE: 130 MMHG | WEIGHT: 233 LBS | BODY MASS INDEX: 29.92 KG/M2 | DIASTOLIC BLOOD PRESSURE: 80 MMHG

## 2021-03-05 DIAGNOSIS — Z87.891 PERSONAL HISTORY OF NICOTINE DEPENDENCE: ICD-10-CM

## 2021-03-05 DIAGNOSIS — G47.31 PRIMARY CENTRAL SLEEP APNEA: ICD-10-CM

## 2021-03-05 DIAGNOSIS — G47.33 OBSTRUCTIVE SLEEP APNEA (ADULT) (PEDIATRIC): ICD-10-CM

## 2021-03-05 PROCEDURE — 99214 OFFICE O/P EST MOD 30 MIN: CPT

## 2021-03-05 PROCEDURE — 99072 ADDL SUPL MATRL&STAF TM PHE: CPT

## 2021-03-05 RX ORDER — TRIAMCINOLONE ACETONIDE 1 MG/G
0.1 CREAM TOPICAL
Qty: 1 | Refills: 2 | Status: COMPLETED | COMMUNITY
Start: 2020-03-12 | End: 2021-03-05

## 2021-03-05 RX ORDER — DESVENLAFAXINE 50 MG/1
50 TABLET, EXTENDED RELEASE ORAL
Qty: 30 | Refills: 0 | Status: COMPLETED | COMMUNITY
Start: 2021-01-12 | End: 2021-03-05

## 2021-03-05 RX ORDER — ONDANSETRON 4 MG/1
4 TABLET, ORALLY DISINTEGRATING ORAL
Qty: 15 | Refills: 0 | Status: COMPLETED | COMMUNITY
Start: 2020-11-06 | End: 2021-03-05

## 2021-05-18 NOTE — ED ADULT TRIAGE NOTE - CHIEF COMPLAINT QUOTE
upset stomach and diaphoretic all day.  has been occurring for a month long Call 911 for stroke/Need for follow up after discharge/Prescribed medications/Risk factors for stroke/Stroke education booklet/Stroke support groups for patients, families, and friends/Stroke warning signs and symptoms/Signs and symptoms of stroke

## 2021-08-17 ENCOUNTER — APPOINTMENT (OUTPATIENT)
Dept: GASTROENTEROLOGY | Facility: CLINIC | Age: 26
End: 2021-08-17

## 2024-07-16 NOTE — ED PROVIDER NOTE - CCCP TRG CHIEF CMPLNT
JEAN CLAUDE AND I HAVE BOTH SPOKEN TO THIS PT. RICKY DOES NOT HAVE A SOONER APPOINTMENT IF PETROS IS CLEARED TO SEE PTS IN PERSON I WILL WORK HER IN.    possible opiate withdrawal/see chief complaint quote

## 2024-07-29 ENCOUNTER — APPOINTMENT (OUTPATIENT)
Dept: GASTROENTEROLOGY | Facility: CLINIC | Age: 29
End: 2024-07-29